# Patient Record
Sex: FEMALE | Race: WHITE | HISPANIC OR LATINO | Employment: OTHER | URBAN - METROPOLITAN AREA
[De-identification: names, ages, dates, MRNs, and addresses within clinical notes are randomized per-mention and may not be internally consistent; named-entity substitution may affect disease eponyms.]

---

## 2019-07-29 ENCOUNTER — HOSPITAL ENCOUNTER (EMERGENCY)
Age: 59
Discharge: HOME | End: 2019-07-29

## 2019-07-29 VITALS
RESPIRATION RATE: 18 BRPM | HEIGHT: 65 IN | DIASTOLIC BLOOD PRESSURE: 76 MMHG | SYSTOLIC BLOOD PRESSURE: 120 MMHG | WEIGHT: 117.2 LBS | OXYGEN SATURATION: 97 % | HEART RATE: 54 BPM | TEMPERATURE: 98.2 F | BODY MASS INDEX: 19.53 KG/M2

## 2019-07-29 DIAGNOSIS — H10.33 ACUTE CONJUNCTIVITIS OF BOTH EYES, UNSPECIFIED ACUTE CONJUNCTIVITIS TYPE: Primary | ICD-10-CM

## 2019-07-29 PROCEDURE — 99282 EMERGENCY DEPT VISIT SF MDM: CPT

## 2019-07-29 RX ORDER — PROPARACAINE HYDROCHLORIDE 5 MG/ML
2 SOLUTION/ DROPS OPHTHALMIC ONCE
Status: DISCONTINUED | OUTPATIENT
Start: 2019-07-29 | End: 2019-07-29

## 2019-07-29 RX ORDER — ERYTHROMYCIN 5 MG/G
0.5 OINTMENT OPHTHALMIC 3 TIMES DAILY
Qty: 3.5 G | Refills: 0 | Status: SHIPPED | OUTPATIENT
Start: 2019-07-29 | End: 2019-08-05

## 2019-07-29 ASSESSMENT — ENCOUNTER SYMPTOMS
FEVER: 0
WEAKNESS: 0
ACTIVITY CHANGE: 0
EYE DISCHARGE: 0
ADENOPATHY: 0
EYE REDNESS: 1
COUGH: 0
HEADACHES: 0
NAUSEA: 0
VOMITING: 0
WHEEZING: 0
SHORTNESS OF BREATH: 0
EYE ITCHING: 1
CHILLS: 0
BACK PAIN: 0
PHOTOPHOBIA: 0
FATIGUE: 0
BRUISES/BLEEDS EASILY: 0
CONSTIPATION: 0
NERVOUS/ANXIOUS: 0
ABDOMINAL PAIN: 0
EYE PAIN: 0
DIARRHEA: 0

## 2019-07-29 ASSESSMENT — PAIN SCALES - GENERAL: PAINLEVEL_OUTOF10: 0

## 2023-07-21 ENCOUNTER — TELEPHONE (OUTPATIENT)
Dept: PRIMARY CARE | Facility: CLINIC | Age: 63
End: 2023-07-21
Payer: COMMERCIAL

## 2023-07-21 DIAGNOSIS — I10 ESSENTIAL HYPERTENSION: ICD-10-CM

## 2023-07-21 RX ORDER — LOSARTAN POTASSIUM 50 MG/1
1 TABLET ORAL DAILY
COMMUNITY
Start: 2021-01-11 | End: 2023-07-24 | Stop reason: SDUPTHER

## 2023-07-21 NOTE — TELEPHONE ENCOUNTER
Rx Refill Request Telephone Encounter    Name:  Jessie Hogan  :  [id number]  Medication Name:  Losartan   Dose : 50 mg  Route : oral  Frequency : daily  Quantity : 90    Specific Pharmacy location:  [facility]  Date of last appointment:  Oct  2021  Date of next appointment:    Best number to reach patient:  [phone]

## 2023-07-24 PROBLEM — I10 ESSENTIAL HYPERTENSION: Status: ACTIVE | Noted: 2023-07-24

## 2023-07-24 RX ORDER — LOSARTAN POTASSIUM 50 MG/1
50 TABLET ORAL DAILY
Qty: 90 TABLET | Refills: 0 | Status: SHIPPED | OUTPATIENT
Start: 2023-07-24 | End: 2023-12-05 | Stop reason: SDUPTHER

## 2023-08-17 ENCOUNTER — TELEPHONE (OUTPATIENT)
Dept: PRIMARY CARE | Facility: CLINIC | Age: 63
End: 2023-08-17
Payer: COMMERCIAL

## 2023-08-17 DIAGNOSIS — E78.2 MIXED HYPERLIPIDEMIA: ICD-10-CM

## 2023-08-17 DIAGNOSIS — Z13.29 THYROID DISORDER SCREEN: ICD-10-CM

## 2023-08-17 DIAGNOSIS — I10 ESSENTIAL HYPERTENSION: ICD-10-CM

## 2023-08-17 DIAGNOSIS — E55.9 VITAMIN D DEFICIENCY: Primary | ICD-10-CM

## 2023-08-17 DIAGNOSIS — R73.01 IMPAIRED FASTING GLUCOSE: ICD-10-CM

## 2023-08-17 PROBLEM — E78.5 HYPERLIPIDEMIA: Status: ACTIVE | Noted: 2023-08-17

## 2023-08-17 NOTE — TELEPHONE ENCOUNTER
Patient is requesting labs to be ordered so she can discuss the results with you at her appt on Monday.

## 2023-08-18 ENCOUNTER — LAB (OUTPATIENT)
Dept: LAB | Facility: LAB | Age: 63
End: 2023-08-18
Payer: COMMERCIAL

## 2023-08-18 DIAGNOSIS — I10 ESSENTIAL HYPERTENSION: ICD-10-CM

## 2023-08-18 DIAGNOSIS — E78.2 MIXED HYPERLIPIDEMIA: ICD-10-CM

## 2023-08-18 DIAGNOSIS — Z13.29 THYROID DISORDER SCREEN: ICD-10-CM

## 2023-08-18 DIAGNOSIS — E55.9 VITAMIN D DEFICIENCY: ICD-10-CM

## 2023-08-18 DIAGNOSIS — R73.01 IMPAIRED FASTING GLUCOSE: ICD-10-CM

## 2023-08-18 LAB
ALANINE AMINOTRANSFERASE (SGPT) (U/L) IN SER/PLAS: 25 U/L (ref 7–45)
ALBUMIN (G/DL) IN SER/PLAS: 4.5 G/DL (ref 3.4–5)
ALKALINE PHOSPHATASE (U/L) IN SER/PLAS: 68 U/L (ref 33–136)
ANION GAP IN SER/PLAS: 12 MMOL/L (ref 10–20)
ASPARTATE AMINOTRANSFERASE (SGOT) (U/L) IN SER/PLAS: 18 U/L (ref 9–39)
BILIRUBIN TOTAL (MG/DL) IN SER/PLAS: 0.5 MG/DL (ref 0–1.2)
CALCIDIOL (25 OH VITAMIN D3) (NG/ML) IN SER/PLAS: 42 NG/ML
CALCIUM (MG/DL) IN SER/PLAS: 9.7 MG/DL (ref 8.6–10.3)
CARBON DIOXIDE, TOTAL (MMOL/L) IN SER/PLAS: 28 MMOL/L (ref 21–32)
CHLORIDE (MMOL/L) IN SER/PLAS: 105 MMOL/L (ref 98–107)
CHOLESTEROL (MG/DL) IN SER/PLAS: 221 MG/DL (ref 0–199)
CHOLESTEROL IN HDL (MG/DL) IN SER/PLAS: 49.3 MG/DL
CHOLESTEROL/HDL RATIO: 4.5
CREATININE (MG/DL) IN SER/PLAS: 0.59 MG/DL (ref 0.5–1.05)
ERYTHROCYTE DISTRIBUTION WIDTH (RATIO) BY AUTOMATED COUNT: 12.2 % (ref 11.5–14.5)
ERYTHROCYTE MEAN CORPUSCULAR HEMOGLOBIN CONCENTRATION (G/DL) BY AUTOMATED: 32.7 G/DL (ref 32–36)
ERYTHROCYTE MEAN CORPUSCULAR VOLUME (FL) BY AUTOMATED COUNT: 89 FL (ref 80–100)
ERYTHROCYTES (10*6/UL) IN BLOOD BY AUTOMATED COUNT: 4.79 X10E12/L (ref 4–5.2)
ESTIMATED AVERAGE GLUCOSE FOR HBA1C: 160 MG/DL
GFR FEMALE: >90 ML/MIN/1.73M2
GLUCOSE (MG/DL) IN SER/PLAS: 131 MG/DL (ref 74–99)
HEMATOCRIT (%) IN BLOOD BY AUTOMATED COUNT: 42.5 % (ref 36–46)
HEMOGLOBIN (G/DL) IN BLOOD: 13.9 G/DL (ref 12–16)
HEMOGLOBIN A1C/HEMOGLOBIN TOTAL IN BLOOD: 7.2 %
LDL: 136 MG/DL (ref 0–99)
LEUKOCYTES (10*3/UL) IN BLOOD BY AUTOMATED COUNT: 6.6 X10E9/L (ref 4.4–11.3)
PLATELETS (10*3/UL) IN BLOOD AUTOMATED COUNT: 227 X10E9/L (ref 150–450)
POTASSIUM (MMOL/L) IN SER/PLAS: 4.7 MMOL/L (ref 3.5–5.3)
PROTEIN TOTAL: 7.1 G/DL (ref 6.4–8.2)
SODIUM (MMOL/L) IN SER/PLAS: 140 MMOL/L (ref 136–145)
THYROTROPIN (MIU/L) IN SER/PLAS BY DETECTION LIMIT <= 0.05 MIU/L: 1.22 MIU/L (ref 0.44–3.98)
TRIGLYCERIDE (MG/DL) IN SER/PLAS: 180 MG/DL (ref 0–149)
UREA NITROGEN (MG/DL) IN SER/PLAS: 15 MG/DL (ref 6–23)
VLDL: 36 MG/DL (ref 0–40)

## 2023-08-18 PROCEDURE — 83036 HEMOGLOBIN GLYCOSYLATED A1C: CPT

## 2023-08-18 PROCEDURE — 85027 COMPLETE CBC AUTOMATED: CPT

## 2023-08-18 PROCEDURE — 82306 VITAMIN D 25 HYDROXY: CPT

## 2023-08-18 PROCEDURE — 80053 COMPREHEN METABOLIC PANEL: CPT

## 2023-08-18 PROCEDURE — 84443 ASSAY THYROID STIM HORMONE: CPT

## 2023-08-18 PROCEDURE — 80061 LIPID PANEL: CPT

## 2023-08-18 PROCEDURE — 36415 COLL VENOUS BLD VENIPUNCTURE: CPT

## 2023-08-21 ENCOUNTER — OFFICE VISIT (OUTPATIENT)
Dept: PRIMARY CARE | Facility: CLINIC | Age: 63
End: 2023-08-21
Payer: COMMERCIAL

## 2023-08-21 VITALS
SYSTOLIC BLOOD PRESSURE: 139 MMHG | HEIGHT: 65 IN | OXYGEN SATURATION: 93 % | WEIGHT: 209 LBS | RESPIRATION RATE: 14 BRPM | HEART RATE: 92 BPM | DIASTOLIC BLOOD PRESSURE: 86 MMHG | BODY MASS INDEX: 34.82 KG/M2 | TEMPERATURE: 97.7 F

## 2023-08-21 DIAGNOSIS — E55.9 VITAMIN D DEFICIENCY: ICD-10-CM

## 2023-08-21 DIAGNOSIS — E78.2 MIXED HYPERLIPIDEMIA: ICD-10-CM

## 2023-08-21 DIAGNOSIS — Z23 ENCOUNTER FOR IMMUNIZATION: ICD-10-CM

## 2023-08-21 DIAGNOSIS — E11.9 DIET-CONTROLLED TYPE 2 DIABETES MELLITUS (MULTI): Primary | ICD-10-CM

## 2023-08-21 DIAGNOSIS — Z12.31 OTHER SCREENING MAMMOGRAM: ICD-10-CM

## 2023-08-21 DIAGNOSIS — I10 ESSENTIAL HYPERTENSION: ICD-10-CM

## 2023-08-21 PROBLEM — R73.01 IMPAIRED FASTING GLUCOSE: Status: RESOLVED | Noted: 2023-08-17 | Resolved: 2023-08-21

## 2023-08-21 PROCEDURE — 3075F SYST BP GE 130 - 139MM HG: CPT | Performed by: FAMILY MEDICINE

## 2023-08-21 PROCEDURE — 90471 IMMUNIZATION ADMIN: CPT | Performed by: FAMILY MEDICINE

## 2023-08-21 PROCEDURE — 4010F ACE/ARB THERAPY RXD/TAKEN: CPT | Performed by: FAMILY MEDICINE

## 2023-08-21 PROCEDURE — 3051F HG A1C>EQUAL 7.0%<8.0%: CPT | Performed by: FAMILY MEDICINE

## 2023-08-21 PROCEDURE — 99214 OFFICE O/P EST MOD 30 MIN: CPT | Performed by: FAMILY MEDICINE

## 2023-08-21 PROCEDURE — 1036F TOBACCO NON-USER: CPT | Performed by: FAMILY MEDICINE

## 2023-08-21 PROCEDURE — 3079F DIAST BP 80-89 MM HG: CPT | Performed by: FAMILY MEDICINE

## 2023-08-21 PROCEDURE — 90750 HZV VACC RECOMBINANT IM: CPT | Performed by: FAMILY MEDICINE

## 2023-08-21 RX ORDER — TRIAMCINOLONE ACETONIDE 1 MG/G
OINTMENT TOPICAL
COMMUNITY
Start: 2023-06-15

## 2023-08-21 RX ORDER — CHOLECALCIFEROL (VITAMIN D3) 125 MCG
1 CAPSULE ORAL DAILY
COMMUNITY
Start: 2020-11-19

## 2023-08-21 ASSESSMENT — ENCOUNTER SYMPTOMS
ARTHRALGIAS: 0
CHILLS: 0
FEVER: 0
CHEST TIGHTNESS: 0
CONFUSION: 0
PALPITATIONS: 0
ABDOMINAL PAIN: 0
SHORTNESS OF BREATH: 0

## 2023-08-21 ASSESSMENT — PATIENT HEALTH QUESTIONNAIRE - PHQ9
SUM OF ALL RESPONSES TO PHQ9 QUESTIONS 1 AND 2: 0
1. LITTLE INTEREST OR PLEASURE IN DOING THINGS: NOT AT ALL
2. FEELING DOWN, DEPRESSED OR HOPELESS: NOT AT ALL

## 2023-08-21 NOTE — PROGRESS NOTES
"Subjective   Patient ID: Jessie Hogan is a 63 y.o. female who presents for Annual Exam.    HPI patient today for follow-up of ongoing healthcare issues and for annual checkup she has not been in since the fall of last year.  She admits she has not been following a proper diet with regards to diabetes and hyperlipidemia.    Review of Systems   Constitutional:  Negative for chills and fever.   HENT:  Negative for congestion and ear pain.    Eyes:  Negative for visual disturbance.   Respiratory:  Negative for chest tightness and shortness of breath.    Cardiovascular:  Negative for chest pain and palpitations.   Gastrointestinal:  Negative for abdominal pain.   Musculoskeletal:  Negative for arthralgias.   Skin:  Negative for pallor.   Psychiatric/Behavioral:  Negative for confusion.        Objective   /86   Pulse 92   Temp 36.5 °C (97.7 °F)   Resp 14   Ht 1.651 m (5' 5\")   Wt 94.8 kg (209 lb)   SpO2 93%   BMI 34.78 kg/m²     Physical Exam  Vitals and nursing note reviewed.   Constitutional:       General: She is not in acute distress.     Appearance: Normal appearance. She is not ill-appearing.   HENT:      Head: Normocephalic and atraumatic.      Right Ear: Tympanic membrane, ear canal and external ear normal.      Left Ear: Tympanic membrane, ear canal and external ear normal.      Mouth/Throat:      Pharynx: Oropharynx is clear.   Eyes:      Extraocular Movements: Extraocular movements intact.   Cardiovascular:      Rate and Rhythm: Normal rate and regular rhythm.      Pulses: Normal pulses.      Heart sounds: Normal heart sounds.   Pulmonary:      Effort: Pulmonary effort is normal.      Breath sounds: Normal breath sounds.   Abdominal:      General: Abdomen is flat. Bowel sounds are normal.      Palpations: Abdomen is soft.      Tenderness: There is no abdominal tenderness.   Musculoskeletal:         General: Normal range of motion.      Cervical back: Neck supple.   Skin:     General: Skin is warm. "   Neurological:      Mental Status: She is alert and oriented to person, place, and time. Mental status is at baseline.   Psychiatric:         Mood and Affect: Mood normal.       Recent Results (from the past 1008 hour(s))   CBC    Collection Time: 08/18/23 11:19 AM   Result Value Ref Range    WBC 6.6 4.4 - 11.3 x10E9/L    RBC 4.79 4.00 - 5.20 x10E12/L    Hemoglobin 13.9 12.0 - 16.0 g/dL    Hematocrit 42.5 36.0 - 46.0 %    MCV 89 80 - 100 fL    MCHC 32.7 32.0 - 36.0 g/dL    Platelets 227 150 - 450 x10E9/L    RDW 12.2 11.5 - 14.5 %   Comprehensive Metabolic Panel    Collection Time: 08/18/23 11:19 AM   Result Value Ref Range    Glucose 131 (H) 74 - 99 mg/dL    Sodium 140 136 - 145 mmol/L    Potassium 4.7 3.5 - 5.3 mmol/L    Chloride 105 98 - 107 mmol/L    Bicarbonate 28 21 - 32 mmol/L    Anion Gap 12 10 - 20 mmol/L    Urea Nitrogen 15 6 - 23 mg/dL    Creatinine 0.59 0.50 - 1.05 mg/dL    GFR Female >90 >90 mL/min/1.73m2    Calcium 9.7 8.6 - 10.3 mg/dL    Albumin 4.5 3.4 - 5.0 g/dL    Alkaline Phosphatase 68 33 - 136 U/L    Total Protein 7.1 6.4 - 8.2 g/dL    AST 18 9 - 39 U/L    Total Bilirubin 0.5 0.0 - 1.2 mg/dL    ALT (SGPT) 25 7 - 45 U/L   Hemoglobin A1C    Collection Time: 08/18/23 11:19 AM   Result Value Ref Range    Hemoglobin A1C 7.2 (A) %    Estimated Average Glucose 160 MG/DL   Lipid Panel    Collection Time: 08/18/23 11:19 AM   Result Value Ref Range    Cholesterol 221 (H) 0 - 199 mg/dL    HDL 49.3 mg/dL    Cholesterol/HDL Ratio 4.5      (H) 0 - 99 mg/dL    VLDL 36 0 - 40 mg/dL    Triglycerides 180 (H) 0 - 149 mg/dL   Vitamin D, Total    Collection Time: 08/18/23 11:19 AM   Result Value Ref Range    Vitamin D, 25-Hydroxy 42 ng/mL   TSH with reflex to Free T4 if abnormal    Collection Time: 08/18/23 11:19 AM   Result Value Ref Range    TSH 1.22 0.44 - 3.98 mIU/L     Recent labs reviewed with patient    Shingrix No. 1 today plan on receiving second Shingrix at next office visit beginning of  December    Return in approximately 3 and half months with repeat fasting labs    Bilateral screening mammogram ordered.      Assessment/Plan   Problem List Items Addressed This Visit       Essential hypertension     Stable continue current medication         Relevant Orders    Follow Up In Primary Care - Established    Comprehensive Metabolic Panel    Hyperlipidemia     LDLs not to goal triglycerides are also slightly elevated.  Given these results in conjunction with her new type II diabetic diagnosis we discussed starting medication such as a statin in addition to diet and exercise at this point she is refusing she wants an opportunity to try lifestyle modifications first to see if she can get the numbers to goal.         Relevant Orders    Follow Up In Primary Care - Established    Comprehensive Metabolic Panel    Lipid Panel    Vitamin D deficiency     Continue to monitor and supplement         Diet-controlled type 2 diabetes mellitus (CMS/[facility]) - Primary     A1c at 7.2%.  We discussed type 2 diabetes.  We discussed treatment plans including starting medication and the various options and additional lifestyle modifications.  At this point she wants an opportunity to try to manage and improve her A1c without medicine and implement stricter lifestyle modifications with regards to diet and exercise.  Long discussion regarding lifestyle changes occluding diet and exercise.  We will lower this opportunity and recheck in approximately 3 months.         Relevant Orders    Follow Up In Primary Care - Established    Comprehensive Metabolic Panel    Hemoglobin A1C    Encounter for immunization     Shingrix No. 1 today return within 2 to 6 months for Shingrix No. 2 risk-benefit side effects reviewed she verbalizes understanding         Relevant Orders    Zoster vaccine, recombinant, adult (SHINGRIX) (Completed)    Other screening mammogram     Bilateral screening mammogram         Relevant Orders    BI mammo bilateral  screening tomosynthesis

## 2023-08-21 NOTE — ASSESSMENT & PLAN NOTE
LDLs not to goal triglycerides are also slightly elevated.  Given these results in conjunction with her new type II diabetic diagnosis we discussed starting medication such as a statin in addition to diet and exercise at this point she is refusing she wants an opportunity to try lifestyle modifications first to see if she can get the numbers to goal.

## 2023-08-21 NOTE — ASSESSMENT & PLAN NOTE
Shingrix No. 1 today return within 2 to 6 months for Shingrix No. 2 risk-benefit side effects reviewed she verbalizes understanding

## 2023-08-21 NOTE — ASSESSMENT & PLAN NOTE
A1c at 7.2%.  We discussed type 2 diabetes.  We discussed treatment plans including starting medication and the various options and additional lifestyle modifications.  At this point she wants an opportunity to try to manage and improve her A1c without medicine and implement stricter lifestyle modifications with regards to diet and exercise.  Long discussion regarding lifestyle changes occluding diet and exercise.  We will lower this opportunity and recheck in approximately 3 months.

## 2023-12-04 ENCOUNTER — LAB (OUTPATIENT)
Dept: LAB | Facility: LAB | Age: 63
End: 2023-12-04
Payer: COMMERCIAL

## 2023-12-04 DIAGNOSIS — E11.9 DIET-CONTROLLED TYPE 2 DIABETES MELLITUS (MULTI): ICD-10-CM

## 2023-12-04 DIAGNOSIS — E78.2 MIXED HYPERLIPIDEMIA: ICD-10-CM

## 2023-12-04 DIAGNOSIS — I10 ESSENTIAL HYPERTENSION: ICD-10-CM

## 2023-12-04 LAB
ALBUMIN SERPL BCP-MCNC: 4.4 G/DL (ref 3.4–5)
ALP SERPL-CCNC: 68 U/L (ref 33–136)
ALT SERPL W P-5'-P-CCNC: 23 U/L (ref 7–45)
ANION GAP SERPL CALC-SCNC: 9 MMOL/L (ref 10–20)
AST SERPL W P-5'-P-CCNC: 22 U/L (ref 9–39)
BILIRUB SERPL-MCNC: 0.6 MG/DL (ref 0–1.2)
BUN SERPL-MCNC: 9 MG/DL (ref 6–23)
CALCIUM SERPL-MCNC: 9.6 MG/DL (ref 8.6–10.3)
CHLORIDE SERPL-SCNC: 106 MMOL/L (ref 98–107)
CHOLEST SERPL-MCNC: 220 MG/DL (ref 0–199)
CHOLESTEROL/HDL RATIO: 4.5
CO2 SERPL-SCNC: 27 MMOL/L (ref 21–32)
CREAT SERPL-MCNC: 0.71 MG/DL (ref 0.5–1.05)
GFR SERPL CREATININE-BSD FRML MDRD: >90 ML/MIN/1.73M*2
GLUCOSE SERPL-MCNC: 123 MG/DL (ref 74–99)
HDLC SERPL-MCNC: 49 MG/DL
LDLC SERPL CALC-MCNC: 144 MG/DL
NON HDL CHOLESTEROL: 171 MG/DL (ref 0–149)
POTASSIUM SERPL-SCNC: 4.3 MMOL/L (ref 3.5–5.3)
PROT SERPL-MCNC: 7.2 G/DL (ref 6.4–8.2)
SODIUM SERPL-SCNC: 138 MMOL/L (ref 136–145)
TRIGL SERPL-MCNC: 136 MG/DL (ref 0–149)
VLDL: 27 MG/DL (ref 0–40)

## 2023-12-04 PROCEDURE — 80061 LIPID PANEL: CPT

## 2023-12-04 PROCEDURE — 36415 COLL VENOUS BLD VENIPUNCTURE: CPT

## 2023-12-04 PROCEDURE — 80053 COMPREHEN METABOLIC PANEL: CPT

## 2023-12-04 PROCEDURE — 83036 HEMOGLOBIN GLYCOSYLATED A1C: CPT

## 2023-12-05 ENCOUNTER — OFFICE VISIT (OUTPATIENT)
Dept: PRIMARY CARE | Facility: CLINIC | Age: 63
End: 2023-12-05
Payer: COMMERCIAL

## 2023-12-05 VITALS
OXYGEN SATURATION: 96 % | DIASTOLIC BLOOD PRESSURE: 88 MMHG | TEMPERATURE: 96.8 F | BODY MASS INDEX: 33.12 KG/M2 | HEART RATE: 80 BPM | WEIGHT: 199 LBS | SYSTOLIC BLOOD PRESSURE: 148 MMHG | RESPIRATION RATE: 14 BRPM

## 2023-12-05 DIAGNOSIS — Z23 ENCOUNTER FOR IMMUNIZATION: ICD-10-CM

## 2023-12-05 DIAGNOSIS — E78.2 MIXED HYPERLIPIDEMIA: ICD-10-CM

## 2023-12-05 DIAGNOSIS — I10 ESSENTIAL HYPERTENSION: ICD-10-CM

## 2023-12-05 DIAGNOSIS — E11.9 DIET-CONTROLLED TYPE 2 DIABETES MELLITUS (MULTI): Primary | ICD-10-CM

## 2023-12-05 LAB
EST. AVERAGE GLUCOSE BLD GHB EST-MCNC: 140 MG/DL
HBA1C MFR BLD: 6.5 %

## 2023-12-05 PROCEDURE — 3044F HG A1C LEVEL LT 7.0%: CPT | Performed by: FAMILY MEDICINE

## 2023-12-05 PROCEDURE — 1036F TOBACCO NON-USER: CPT | Performed by: FAMILY MEDICINE

## 2023-12-05 PROCEDURE — 3077F SYST BP >= 140 MM HG: CPT | Performed by: FAMILY MEDICINE

## 2023-12-05 PROCEDURE — 90471 IMMUNIZATION ADMIN: CPT | Performed by: FAMILY MEDICINE

## 2023-12-05 PROCEDURE — 99214 OFFICE O/P EST MOD 30 MIN: CPT | Performed by: FAMILY MEDICINE

## 2023-12-05 PROCEDURE — 3079F DIAST BP 80-89 MM HG: CPT | Performed by: FAMILY MEDICINE

## 2023-12-05 PROCEDURE — 4010F ACE/ARB THERAPY RXD/TAKEN: CPT | Performed by: FAMILY MEDICINE

## 2023-12-05 PROCEDURE — 3050F LDL-C >= 130 MG/DL: CPT | Performed by: FAMILY MEDICINE

## 2023-12-05 PROCEDURE — 90750 HZV VACC RECOMBINANT IM: CPT | Performed by: FAMILY MEDICINE

## 2023-12-05 RX ORDER — LOSARTAN POTASSIUM 50 MG/1
50 TABLET ORAL DAILY
Qty: 90 TABLET | Refills: 3 | Status: SHIPPED | OUTPATIENT
Start: 2023-12-05 | End: 2024-04-29 | Stop reason: SDUPTHER

## 2023-12-05 ASSESSMENT — ENCOUNTER SYMPTOMS
FEVER: 0
ARTHRALGIAS: 0
CHEST TIGHTNESS: 0
PALPITATIONS: 0
CONFUSION: 0
CHILLS: 0
SHORTNESS OF BREATH: 0
ABDOMINAL PAIN: 0

## 2023-12-05 NOTE — ASSESSMENT & PLAN NOTE
Cholesterol/LDL is trending upward we discussed try to get LDL below 100 given her other medical issues we discussed starting cholesterol-lowering medicine at this point she is declined she wants to try lifestyle modifications first.

## 2023-12-05 NOTE — ASSESSMENT & PLAN NOTE
Resume Cozaar 50 mg daily refill sent to pharmacy.  Patient advised moving forward not to go without our hypertensive medicine for weeks at a time.

## 2023-12-05 NOTE — PROGRESS NOTES
Subjective   Patient ID: Jessie Hogan is a 63 y.o. female who presents for Follow-up (3 month).    HPI   Patient today for follow-up of ongoing healthcare issues and review blood work.  She states she has been out of her blood pressure medicine for a couple of weeks stating she knew she had an upcoming appointment.  Review of Systems   Constitutional:  Negative for chills and fever.   HENT:  Negative for congestion and ear pain.    Eyes:  Negative for visual disturbance.   Respiratory:  Negative for chest tightness and shortness of breath.    Cardiovascular:  Negative for chest pain and palpitations.   Gastrointestinal:  Negative for abdominal pain.   Musculoskeletal:  Negative for arthralgias.   Skin:  Negative for pallor.   Psychiatric/Behavioral:  Negative for confusion.        Objective   /88   Pulse 80   Temp 36 °C (96.8 °F)   Resp 14   Wt 90.3 kg (199 lb)   SpO2 96%   BMI 33.12 kg/m²     Physical Exam  Vitals and nursing note reviewed.   Constitutional:       General: She is not in acute distress.     Appearance: Normal appearance. She is not ill-appearing.   HENT:      Head: Normocephalic and atraumatic.      Right Ear: Tympanic membrane, ear canal and external ear normal.      Left Ear: Tympanic membrane, ear canal and external ear normal.      Mouth/Throat:      Pharynx: Oropharynx is clear.   Eyes:      Extraocular Movements: Extraocular movements intact.   Cardiovascular:      Rate and Rhythm: Normal rate and regular rhythm.      Pulses: Normal pulses.      Heart sounds: Normal heart sounds.   Pulmonary:      Effort: Pulmonary effort is normal.      Breath sounds: Normal breath sounds.   Abdominal:      General: Abdomen is flat. Bowel sounds are normal.      Palpations: Abdomen is soft.      Tenderness: There is no abdominal tenderness.   Musculoskeletal:         General: Normal range of motion.      Cervical back: Neck supple.   Skin:     General: Skin is warm.   Neurological:      Mental  Status: She is alert and oriented to person, place, and time. Mental status is at baseline.   Psychiatric:         Mood and Affect: Mood normal.       Recent Results (from the past 1008 hour(s))   Comprehensive Metabolic Panel    Collection Time: 12/04/23  9:09 AM   Result Value Ref Range    Glucose 123 (H) 74 - 99 mg/dL    Sodium 138 136 - 145 mmol/L    Potassium 4.3 3.5 - 5.3 mmol/L    Chloride 106 98 - 107 mmol/L    Bicarbonate 27 21 - 32 mmol/L    Anion Gap 9 (L) 10 - 20 mmol/L    Urea Nitrogen 9 6 - 23 mg/dL    Creatinine 0.71 0.50 - 1.05 mg/dL    eGFR >90 >60 mL/min/1.73m*2    Calcium 9.6 8.6 - 10.3 mg/dL    Albumin 4.4 3.4 - 5.0 g/dL    Alkaline Phosphatase 68 33 - 136 U/L    Total Protein 7.2 6.4 - 8.2 g/dL    AST 22 9 - 39 U/L    Bilirubin, Total 0.6 0.0 - 1.2 mg/dL    ALT 23 7 - 45 U/L   Hemoglobin A1C    Collection Time: 12/04/23  9:09 AM   Result Value Ref Range    Hemoglobin A1C 6.5 (H) see below %    Estimated Average Glucose 140 Not Established mg/dL   Lipid Panel    Collection Time: 12/04/23  9:09 AM   Result Value Ref Range    Cholesterol 220 (H) 0 - 199 mg/dL    HDL-Cholesterol 49.0 mg/dL    Cholesterol/HDL Ratio 4.5     LDL Calculated 144 (H) <=99 mg/dL    VLDL 27 0 - 40 mg/dL    Triglycerides 136 0 - 149 mg/dL    Non HDL Cholesterol 171 (H) 0 - 149 mg/dL     Recent labs reviewed with patient she is to follow a low-fat low-cholesterol diabetic diet    Shingrix No. 2 today    She also plans on getting a COVID booster in a couple weeks she states she is already had the RSV vaccine and flu vaccine for this year.    Return to our office in 3 and half months with repeat fasting lab    Refill on Cozaar provided    Assessment/Plan   Problem List Items Addressed This Visit             ICD-10-CM    Essential hypertension I10     Resume Cozaar 50 mg daily refill sent to pharmacy.  Patient advised moving forward not to go without our hypertensive medicine for weeks at a time.         Relevant Medications     losartan (Cozaar) 50 mg tablet    Other Relevant Orders    Follow Up In Primary Care - Established    Hyperlipidemia E78.5     Cholesterol/LDL is trending upward we discussed try to get LDL below 100 given her other medical issues we discussed starting cholesterol-lowering medicine at this point she is declined she wants to try lifestyle modifications first.         Relevant Orders    Lipid Panel    Cholesterol, LDL Direct    Follow Up In Primary Care - Established    Diet-controlled type 2 diabetes mellitus (CMS/[facility]) - Primary E11.9     A1c is improved down to 6.5% continue dietary modifications         Relevant Orders    Hemoglobin A1C    Comprehensive Metabolic Panel    Follow Up In Primary Care - Established    Encounter for immunization Z23     Shingrix No. 2 today         Relevant Orders    Zoster vaccine, recombinant, adult (SHINGRIX)

## 2024-04-02 ENCOUNTER — APPOINTMENT (OUTPATIENT)
Dept: PRIMARY CARE | Facility: CLINIC | Age: 64
End: 2024-04-02
Payer: COMMERCIAL

## 2024-04-26 ENCOUNTER — LAB (OUTPATIENT)
Dept: LAB | Facility: LAB | Age: 64
End: 2024-04-26
Payer: COMMERCIAL

## 2024-04-26 DIAGNOSIS — E78.2 MIXED HYPERLIPIDEMIA: ICD-10-CM

## 2024-04-26 DIAGNOSIS — E11.9 DIET-CONTROLLED TYPE 2 DIABETES MELLITUS (MULTI): ICD-10-CM

## 2024-04-26 LAB
ALBUMIN SERPL BCP-MCNC: 4.2 G/DL (ref 3.4–5)
ALP SERPL-CCNC: 70 U/L (ref 33–136)
ALT SERPL W P-5'-P-CCNC: 16 U/L (ref 7–45)
ANION GAP SERPL CALC-SCNC: 12 MMOL/L (ref 10–20)
AST SERPL W P-5'-P-CCNC: 16 U/L (ref 9–39)
BILIRUB SERPL-MCNC: 0.6 MG/DL (ref 0–1.2)
BUN SERPL-MCNC: 13 MG/DL (ref 6–23)
CALCIUM SERPL-MCNC: 9.7 MG/DL (ref 8.6–10.3)
CHLORIDE SERPL-SCNC: 106 MMOL/L (ref 98–107)
CHOLEST SERPL-MCNC: 213 MG/DL (ref 0–199)
CHOLESTEROL/HDL RATIO: 4.5
CO2 SERPL-SCNC: 29 MMOL/L (ref 21–32)
CREAT SERPL-MCNC: 0.79 MG/DL (ref 0.5–1.05)
EGFRCR SERPLBLD CKD-EPI 2021: 85 ML/MIN/1.73M*2
EST. AVERAGE GLUCOSE BLD GHB EST-MCNC: 137 MG/DL
GLUCOSE SERPL-MCNC: 135 MG/DL (ref 74–99)
HBA1C MFR BLD: 6.4 %
HDLC SERPL-MCNC: 47.6 MG/DL
LDLC SERPL CALC-MCNC: 124 MG/DL
LDLC SERPL DIRECT ASSAY-MCNC: 143 MG/DL (ref 0–129)
NON HDL CHOLESTEROL: 165 MG/DL (ref 0–149)
POTASSIUM SERPL-SCNC: 4.7 MMOL/L (ref 3.5–5.3)
PROT SERPL-MCNC: 6.8 G/DL (ref 6.4–8.2)
SODIUM SERPL-SCNC: 142 MMOL/L (ref 136–145)
TRIGL SERPL-MCNC: 206 MG/DL (ref 0–149)
VLDL: 41 MG/DL (ref 0–40)

## 2024-04-26 PROCEDURE — 36415 COLL VENOUS BLD VENIPUNCTURE: CPT

## 2024-04-26 PROCEDURE — 80053 COMPREHEN METABOLIC PANEL: CPT

## 2024-04-26 PROCEDURE — 80061 LIPID PANEL: CPT

## 2024-04-26 PROCEDURE — 83721 ASSAY OF BLOOD LIPOPROTEIN: CPT

## 2024-04-26 PROCEDURE — 83036 HEMOGLOBIN GLYCOSYLATED A1C: CPT

## 2024-04-29 ENCOUNTER — OFFICE VISIT (OUTPATIENT)
Dept: PRIMARY CARE | Facility: CLINIC | Age: 64
End: 2024-04-29
Payer: COMMERCIAL

## 2024-04-29 VITALS
OXYGEN SATURATION: 94 % | TEMPERATURE: 97.4 F | SYSTOLIC BLOOD PRESSURE: 133 MMHG | RESPIRATION RATE: 14 BRPM | BODY MASS INDEX: 32.12 KG/M2 | WEIGHT: 193 LBS | HEART RATE: 80 BPM | DIASTOLIC BLOOD PRESSURE: 84 MMHG

## 2024-04-29 DIAGNOSIS — E55.9 VITAMIN D DEFICIENCY: ICD-10-CM

## 2024-04-29 DIAGNOSIS — Z13.29 THYROID DISORDER SCREEN: ICD-10-CM

## 2024-04-29 DIAGNOSIS — I10 ESSENTIAL HYPERTENSION: ICD-10-CM

## 2024-04-29 DIAGNOSIS — E11.9 DIET-CONTROLLED TYPE 2 DIABETES MELLITUS (MULTI): Primary | ICD-10-CM

## 2024-04-29 DIAGNOSIS — Z12.31 OTHER SCREENING MAMMOGRAM: ICD-10-CM

## 2024-04-29 DIAGNOSIS — E78.2 MIXED HYPERLIPIDEMIA: ICD-10-CM

## 2024-04-29 PROCEDURE — 4010F ACE/ARB THERAPY RXD/TAKEN: CPT | Performed by: FAMILY MEDICINE

## 2024-04-29 PROCEDURE — 3075F SYST BP GE 130 - 139MM HG: CPT | Performed by: FAMILY MEDICINE

## 2024-04-29 PROCEDURE — 3050F LDL-C >= 130 MG/DL: CPT | Performed by: FAMILY MEDICINE

## 2024-04-29 PROCEDURE — 3079F DIAST BP 80-89 MM HG: CPT | Performed by: FAMILY MEDICINE

## 2024-04-29 PROCEDURE — 99214 OFFICE O/P EST MOD 30 MIN: CPT | Performed by: FAMILY MEDICINE

## 2024-04-29 PROCEDURE — 3044F HG A1C LEVEL LT 7.0%: CPT | Performed by: FAMILY MEDICINE

## 2024-04-29 PROCEDURE — 1036F TOBACCO NON-USER: CPT | Performed by: FAMILY MEDICINE

## 2024-04-29 RX ORDER — ATORVASTATIN CALCIUM 20 MG/1
20 TABLET, FILM COATED ORAL DAILY
Qty: 90 TABLET | Refills: 3 | Status: SHIPPED | OUTPATIENT
Start: 2024-04-29 | End: 2025-04-29

## 2024-04-29 RX ORDER — LOSARTAN POTASSIUM 50 MG/1
50 TABLET ORAL DAILY
Qty: 90 TABLET | Refills: 3 | Status: SHIPPED | OUTPATIENT
Start: 2024-04-29

## 2024-04-29 ASSESSMENT — ENCOUNTER SYMPTOMS
CHEST TIGHTNESS: 0
PALPITATIONS: 0
FEVER: 0
CONFUSION: 0
ARTHRALGIAS: 0
SHORTNESS OF BREATH: 0
ABDOMINAL PAIN: 0
CHILLS: 0

## 2024-04-29 NOTE — PROGRESS NOTES
Subjective   Patient ID: Jessie Hogan is a 64 y.o. female who presents for Follow-up (4 month).    HPI patient today for follow-up of ongoing healthcare issues and review blood work overall is been feeling good.  She has been working on lifestyle modifications in effort to improve her A1c.  She needs prescription sent away to her [place] company [facility].    Review of Systems   Constitutional:  Negative for chills and fever.   HENT:  Negative for congestion and ear pain.    Eyes:  Negative for visual disturbance.   Respiratory:  Negative for chest tightness and shortness of breath.    Cardiovascular:  Negative for chest pain and palpitations.   Gastrointestinal:  Negative for abdominal pain.   Musculoskeletal:  Negative for arthralgias.   Skin:  Negative for pallor.   Psychiatric/Behavioral:  Negative for confusion.        Objective   /84   Pulse 80   Temp 36.3 °C (97.4 °F)   Resp 14   Wt 87.5 kg (193 lb)   SpO2 94%   BMI 32.12 kg/m²     Physical Exam  Vitals and nursing note reviewed.   Constitutional:       General: She is not in acute distress.     Appearance: Normal appearance. She is not ill-appearing.   HENT:      Head: Normocephalic and atraumatic.      Right Ear: Tympanic membrane, ear canal and external ear normal.      Left Ear: Tympanic membrane, ear canal and external ear normal.      Mouth/Throat:      Pharynx: Oropharynx is clear.   Eyes:      Extraocular Movements: Extraocular movements intact.   Cardiovascular:      Rate and Rhythm: Normal rate and regular rhythm.      Pulses: Normal pulses.      Heart sounds: Normal heart sounds.   Pulmonary:      Effort: Pulmonary effort is normal.      Breath sounds: Normal breath sounds.   Abdominal:      General: Abdomen is flat. Bowel sounds are normal.      Palpations: Abdomen is soft.      Tenderness: There is no abdominal tenderness.   Musculoskeletal:         General: Normal range of motion.      Cervical back: Neck supple.   Skin:      General: Skin is warm.   Neurological:      Mental Status: She is alert and oriented to person, place, and time. Mental status is at baseline.   Psychiatric:         Mood and Affect: Mood normal.       Recent Results (from the past 1008 hour(s))   Hemoglobin A1C    Collection Time: 04/26/24  7:17 AM   Result Value Ref Range    Hemoglobin A1C 6.4 (H) see below %    Estimated Average Glucose 137 Not Established mg/dL   Lipid Panel    Collection Time: 04/26/24  7:17 AM   Result Value Ref Range    Cholesterol 213 (H) 0 - 199 mg/dL    HDL-Cholesterol 47.6 mg/dL    Cholesterol/HDL Ratio 4.5     LDL Calculated 124 (H) <=99 mg/dL    VLDL 41 (H) 0 - 40 mg/dL    Triglycerides 206 (H) 0 - 149 mg/dL    Non HDL Cholesterol 165 (H) 0 - 149 mg/dL   Comprehensive Metabolic Panel    Collection Time: 04/26/24  7:17 AM   Result Value Ref Range    Glucose 135 (H) 74 - 99 mg/dL    Sodium 142 136 - 145 mmol/L    Potassium 4.7 3.5 - 5.3 mmol/L    Chloride 106 98 - 107 mmol/L    Bicarbonate 29 21 - 32 mmol/L    Anion Gap 12 10 - 20 mmol/L    Urea Nitrogen 13 6 - 23 mg/dL    Creatinine 0.79 0.50 - 1.05 mg/dL    eGFR 85 >60 mL/min/1.73m*2    Calcium 9.7 8.6 - 10.3 mg/dL    Albumin 4.2 3.4 - 5.0 g/dL    Alkaline Phosphatase 70 33 - 136 U/L    Total Protein 6.8 6.4 - 8.2 g/dL    AST 16 9 - 39 U/L    Bilirubin, Total 0.6 0.0 - 1.2 mg/dL    ALT 16 7 - 45 U/L   Cholesterol, LDL Direct    Collection Time: 04/26/24  7:17 AM   Result Value Ref Range    LDL, Direct 143 (H) 0 - 129 mg/dL     Recent labs reviewed with patient  Start Lipitor 20 mg daily risk-benefit side effects reviewed    A1c is improving continue to work on dietary/lifestyle modifications  We also discussed introducing exercise to her weekly regimen.    Yearly diabetic eye exam    Bilateral screening mammogram ordered for end of July 2024    Return to office 4 months with repeat fasting labs    Assessment/Plan   Problem List Items Addressed This Visit             ICD-10-CM     Essential hypertension I10     Stable continue losartan 50 mg daily refill provided         Relevant Orders    CBC    Comprehensive Metabolic Panel    Follow Up In Primary Care - Established    Hyperlipidemia E78.5     LDL did improve down to 124 however given her diabetic diagnosis she is not to goal we reviewed these recommendations and suggested starting a statin and she is in agreement we will start Lipitor 20 mg daily risk-benefit side effects reviewed she verbalizes understanding.         Relevant Orders    Comprehensive Metabolic Panel    Lipid Panel    Follow Up In Primary Care - Established    Vitamin D deficiency E55.9     Continue to monitor supplement as needed         Relevant Orders    Vitamin D 25-Hydroxy,Total (for eval of Vitamin D levels)    Thyroid disorder screen Z13.29     TSH with reflex         Relevant Orders    TSH with reflex to Free T4 if abnormal    Diet-controlled type 2 diabetes mellitus ([facility]) - Primary E11.9     A1c is improved down to 6.4% continue to work on dietary/lifestyle modification         Relevant Orders    CBC    Comprehensive Metabolic Panel    Hemoglobin A1C    Follow Up In Primary Care - Established    Other screening mammogram Z12.31     Bilateral screening mammogram for end of August 2024         Relevant Orders    BI mammo bilateral screening tomosynthesis

## 2024-04-29 NOTE — ASSESSMENT & PLAN NOTE
LDL did improve down to 124 however given her diabetic diagnosis she is not to goal we reviewed these recommendations and suggested starting a statin and she is in agreement we will start Lipitor 20 mg daily risk-benefit side effects reviewed she verbalizes understanding.

## 2024-09-10 ENCOUNTER — HOSPITAL ENCOUNTER (OUTPATIENT)
Dept: RADIOLOGY | Facility: CLINIC | Age: 64
Discharge: HOME | End: 2024-09-10
Payer: COMMERCIAL

## 2024-09-10 VITALS — HEIGHT: 64 IN | BODY MASS INDEX: 32.44 KG/M2 | WEIGHT: 190 LBS

## 2024-09-10 DIAGNOSIS — Z12.31 OTHER SCREENING MAMMOGRAM: ICD-10-CM

## 2024-09-10 PROCEDURE — 77067 SCR MAMMO BI INCL CAD: CPT

## 2024-09-10 PROCEDURE — 77063 BREAST TOMOSYNTHESIS BI: CPT

## 2024-09-16 ENCOUNTER — APPOINTMENT (OUTPATIENT)
Dept: PRIMARY CARE | Facility: CLINIC | Age: 64
End: 2024-09-16
Payer: COMMERCIAL

## 2024-10-02 ENCOUNTER — APPOINTMENT (OUTPATIENT)
Dept: PRIMARY CARE | Facility: CLINIC | Age: 64
End: 2024-10-02
Payer: COMMERCIAL

## 2024-10-14 ENCOUNTER — LAB (OUTPATIENT)
Dept: LAB | Facility: LAB | Age: 64
End: 2024-10-14
Payer: COMMERCIAL

## 2024-10-14 DIAGNOSIS — E11.9 DIET-CONTROLLED TYPE 2 DIABETES MELLITUS: ICD-10-CM

## 2024-10-14 DIAGNOSIS — Z13.29 THYROID DISORDER SCREEN: ICD-10-CM

## 2024-10-14 DIAGNOSIS — E55.9 VITAMIN D DEFICIENCY: ICD-10-CM

## 2024-10-14 DIAGNOSIS — E78.2 MIXED HYPERLIPIDEMIA: ICD-10-CM

## 2024-10-14 DIAGNOSIS — I10 ESSENTIAL HYPERTENSION: ICD-10-CM

## 2024-10-14 LAB
25(OH)D3 SERPL-MCNC: 19 NG/ML (ref 30–100)
ALBUMIN SERPL BCP-MCNC: 4.3 G/DL (ref 3.4–5)
ALP SERPL-CCNC: 70 U/L (ref 33–136)
ALT SERPL W P-5'-P-CCNC: 14 U/L (ref 7–45)
ANION GAP SERPL CALC-SCNC: 10 MMOL/L (ref 10–20)
AST SERPL W P-5'-P-CCNC: 14 U/L (ref 9–39)
BILIRUB SERPL-MCNC: 0.5 MG/DL (ref 0–1.2)
BUN SERPL-MCNC: 13 MG/DL (ref 6–23)
CALCIUM SERPL-MCNC: 9.5 MG/DL (ref 8.6–10.3)
CHLORIDE SERPL-SCNC: 105 MMOL/L (ref 98–107)
CHOLEST SERPL-MCNC: 143 MG/DL (ref 0–199)
CHOLESTEROL/HDL RATIO: 2.9
CO2 SERPL-SCNC: 31 MMOL/L (ref 21–32)
CREAT SERPL-MCNC: 0.8 MG/DL (ref 0.5–1.05)
EGFRCR SERPLBLD CKD-EPI 2021: 83 ML/MIN/1.73M*2
ERYTHROCYTE [DISTWIDTH] IN BLOOD BY AUTOMATED COUNT: 12.3 % (ref 11.5–14.5)
GLUCOSE SERPL-MCNC: 115 MG/DL (ref 74–99)
HCT VFR BLD AUTO: 42.3 % (ref 36–46)
HDLC SERPL-MCNC: 49.4 MG/DL
HGB BLD-MCNC: 14 G/DL (ref 12–16)
LDLC SERPL CALC-MCNC: 75 MG/DL
MCH RBC QN AUTO: 29.2 PG (ref 26–34)
MCHC RBC AUTO-ENTMCNC: 33.1 G/DL (ref 32–36)
MCV RBC AUTO: 88 FL (ref 80–100)
NON HDL CHOLESTEROL: 94 MG/DL (ref 0–149)
NRBC BLD-RTO: 0 /100 WBCS (ref 0–0)
PLATELET # BLD AUTO: 200 X10*3/UL (ref 150–450)
POTASSIUM SERPL-SCNC: 5.4 MMOL/L (ref 3.5–5.3)
PROT SERPL-MCNC: 6.6 G/DL (ref 6.4–8.2)
RBC # BLD AUTO: 4.79 X10*6/UL (ref 4–5.2)
SODIUM SERPL-SCNC: 141 MMOL/L (ref 136–145)
TRIGL SERPL-MCNC: 91 MG/DL (ref 0–149)
TSH SERPL-ACNC: 1.83 MIU/L (ref 0.44–3.98)
VLDL: 18 MG/DL (ref 0–40)
WBC # BLD AUTO: 6.5 X10*3/UL (ref 4.4–11.3)

## 2024-10-14 PROCEDURE — 83036 HEMOGLOBIN GLYCOSYLATED A1C: CPT

## 2024-10-14 PROCEDURE — 36415 COLL VENOUS BLD VENIPUNCTURE: CPT

## 2024-10-14 PROCEDURE — 80053 COMPREHEN METABOLIC PANEL: CPT

## 2024-10-14 PROCEDURE — 85027 COMPLETE CBC AUTOMATED: CPT

## 2024-10-14 PROCEDURE — 82306 VITAMIN D 25 HYDROXY: CPT

## 2024-10-14 PROCEDURE — 84443 ASSAY THYROID STIM HORMONE: CPT

## 2024-10-14 PROCEDURE — 80061 LIPID PANEL: CPT

## 2024-10-15 ENCOUNTER — APPOINTMENT (OUTPATIENT)
Dept: PRIMARY CARE | Facility: CLINIC | Age: 64
End: 2024-10-15
Payer: COMMERCIAL

## 2024-10-15 LAB
EST. AVERAGE GLUCOSE BLD GHB EST-MCNC: 140 MG/DL
HBA1C MFR BLD: 6.5 %

## 2024-10-23 ENCOUNTER — APPOINTMENT (OUTPATIENT)
Dept: PRIMARY CARE | Facility: CLINIC | Age: 64
End: 2024-10-23
Payer: COMMERCIAL

## 2024-10-23 VITALS
BODY MASS INDEX: 31.82 KG/M2 | TEMPERATURE: 97.8 F | HEART RATE: 99 BPM | SYSTOLIC BLOOD PRESSURE: 135 MMHG | WEIGHT: 191 LBS | OXYGEN SATURATION: 95 % | DIASTOLIC BLOOD PRESSURE: 89 MMHG | RESPIRATION RATE: 15 BRPM | HEIGHT: 65 IN

## 2024-10-23 DIAGNOSIS — E87.5 HYPERKALEMIA: ICD-10-CM

## 2024-10-23 DIAGNOSIS — I10 ESSENTIAL HYPERTENSION: ICD-10-CM

## 2024-10-23 DIAGNOSIS — E55.9 VITAMIN D DEFICIENCY: ICD-10-CM

## 2024-10-23 DIAGNOSIS — E11.9 DIET-CONTROLLED TYPE 2 DIABETES MELLITUS: ICD-10-CM

## 2024-10-23 DIAGNOSIS — Z23 ENCOUNTER FOR IMMUNIZATION: ICD-10-CM

## 2024-10-23 DIAGNOSIS — E78.2 MIXED HYPERLIPIDEMIA: ICD-10-CM

## 2024-10-23 DIAGNOSIS — Z00.00 ANNUAL PHYSICAL EXAM: Primary | ICD-10-CM

## 2024-10-23 PROCEDURE — 3075F SYST BP GE 130 - 139MM HG: CPT | Performed by: FAMILY MEDICINE

## 2024-10-23 PROCEDURE — 3044F HG A1C LEVEL LT 7.0%: CPT | Performed by: FAMILY MEDICINE

## 2024-10-23 PROCEDURE — 90656 IIV3 VACC NO PRSV 0.5 ML IM: CPT | Performed by: FAMILY MEDICINE

## 2024-10-23 PROCEDURE — 3079F DIAST BP 80-89 MM HG: CPT | Performed by: FAMILY MEDICINE

## 2024-10-23 PROCEDURE — 99396 PREV VISIT EST AGE 40-64: CPT | Performed by: FAMILY MEDICINE

## 2024-10-23 PROCEDURE — 3008F BODY MASS INDEX DOCD: CPT | Performed by: FAMILY MEDICINE

## 2024-10-23 PROCEDURE — 90471 IMMUNIZATION ADMIN: CPT | Performed by: FAMILY MEDICINE

## 2024-10-23 PROCEDURE — 4010F ACE/ARB THERAPY RXD/TAKEN: CPT | Performed by: FAMILY MEDICINE

## 2024-10-23 PROCEDURE — 99214 OFFICE O/P EST MOD 30 MIN: CPT | Performed by: FAMILY MEDICINE

## 2024-10-23 PROCEDURE — 3048F LDL-C <100 MG/DL: CPT | Performed by: FAMILY MEDICINE

## 2024-10-23 PROCEDURE — 1036F TOBACCO NON-USER: CPT | Performed by: FAMILY MEDICINE

## 2024-10-23 ASSESSMENT — ENCOUNTER SYMPTOMS
FEVER: 0
ABDOMINAL PAIN: 0
PALPITATIONS: 0
CONFUSION: 0
SHORTNESS OF BREATH: 0
CHILLS: 0
CHEST TIGHTNESS: 0
ARTHRALGIAS: 0

## 2024-10-23 ASSESSMENT — PATIENT HEALTH QUESTIONNAIRE - PHQ9
SUM OF ALL RESPONSES TO PHQ9 QUESTIONS 1 AND 2: 0
2. FEELING DOWN, DEPRESSED OR HOPELESS: NOT AT ALL
1. LITTLE INTEREST OR PLEASURE IN DOING THINGS: NOT AT ALL

## 2024-10-23 ASSESSMENT — COLUMBIA-SUICIDE SEVERITY RATING SCALE - C-SSRS
1. IN THE PAST MONTH, HAVE YOU WISHED YOU WERE DEAD OR WISHED YOU COULD GO TO SLEEP AND NOT WAKE UP?: NO
6. HAVE YOU EVER DONE ANYTHING, STARTED TO DO ANYTHING, OR PREPARED TO DO ANYTHING TO END YOUR LIFE?: NO
2. HAVE YOU ACTUALLY HAD ANY THOUGHTS OF KILLING YOURSELF?: NO

## 2024-10-23 NOTE — ASSESSMENT & PLAN NOTE
Recent labs reviewed    Colon cancer screening up-to-date  Mammogram up-to-date    Flu shot x 1

## 2024-10-23 NOTE — PROGRESS NOTES
"Subjective   Patient ID: Jessie Hogan is a 64 y.o. female who presents for Annual Exam (4 month).    HPI patient today for follow-up of ongoing healthcare issues and for annual checkup overalls been feeling good.  Denies any major new acute complaints.  She would like to get a flu shot.    Review of Systems   Constitutional:  Negative for chills and fever.   HENT:  Negative for congestion and ear pain.    Eyes:  Negative for visual disturbance.   Respiratory:  Negative for chest tightness and shortness of breath.    Cardiovascular:  Negative for chest pain and palpitations.   Gastrointestinal:  Negative for abdominal pain.   Musculoskeletal:  Negative for arthralgias.   Skin:  Negative for pallor.   Psychiatric/Behavioral:  Negative for confusion.        Objective   /89   Pulse 99   Temp 36.6 °C (97.8 °F)   Resp 15   Ht 1.651 m (5' 5\")   Wt 86.6 kg (191 lb)   SpO2 95%   BMI 31.78 kg/m²     Physical Exam  Vitals and nursing note reviewed.   Constitutional:       General: She is not in acute distress.     Appearance: Normal appearance. She is not ill-appearing.   HENT:      Head: Normocephalic and atraumatic.      Right Ear: Tympanic membrane, ear canal and external ear normal.      Left Ear: Tympanic membrane, ear canal and external ear normal.      Mouth/Throat:      Pharynx: Oropharynx is clear.   Eyes:      Extraocular Movements: Extraocular movements intact.   Cardiovascular:      Rate and Rhythm: Normal rate and regular rhythm.      Pulses: Normal pulses.      Heart sounds: Normal heart sounds.   Pulmonary:      Effort: Pulmonary effort is normal.      Breath sounds: Normal breath sounds.   Abdominal:      General: Abdomen is flat. Bowel sounds are normal.      Palpations: Abdomen is soft.      Tenderness: There is no abdominal tenderness.   Musculoskeletal:         General: Normal range of motion.      Cervical back: Neck supple.   Skin:     General: Skin is warm.   Neurological:      Mental " Status: She is alert and oriented to person, place, and time. Mental status is at baseline.   Psychiatric:         Mood and Affect: Mood normal.       Recent Results (from the past 6 weeks)   CBC    Collection Time: 10/14/24  9:53 AM   Result Value Ref Range    WBC 6.5 4.4 - 11.3 x10*3/uL    nRBC 0.0 0.0 - 0.0 /100 WBCs    RBC 4.79 4.00 - 5.20 x10*6/uL    Hemoglobin 14.0 12.0 - 16.0 g/dL    Hematocrit 42.3 36.0 - 46.0 %    MCV 88 80 - 100 fL    MCH 29.2 26.0 - 34.0 pg    MCHC 33.1 32.0 - 36.0 g/dL    RDW 12.3 11.5 - 14.5 %    Platelets 200 150 - 450 x10*3/uL   Comprehensive Metabolic Panel    Collection Time: 10/14/24  9:53 AM   Result Value Ref Range    Glucose 115 (H) 74 - 99 mg/dL    Sodium 141 136 - 145 mmol/L    Potassium 5.4 (H) 3.5 - 5.3 mmol/L    Chloride 105 98 - 107 mmol/L    Bicarbonate 31 21 - 32 mmol/L    Anion Gap 10 10 - 20 mmol/L    Urea Nitrogen 13 6 - 23 mg/dL    Creatinine 0.80 0.50 - 1.05 mg/dL    eGFR 83 >60 mL/min/1.73m*2    Calcium 9.5 8.6 - 10.3 mg/dL    Albumin 4.3 3.4 - 5.0 g/dL    Alkaline Phosphatase 70 33 - 136 U/L    Total Protein 6.6 6.4 - 8.2 g/dL    AST 14 9 - 39 U/L    Bilirubin, Total 0.5 0.0 - 1.2 mg/dL    ALT 14 7 - 45 U/L   Hemoglobin A1C    Collection Time: 10/14/24  9:53 AM   Result Value Ref Range    Hemoglobin A1C 6.5 (H) See comment %    Estimated Average Glucose 140 Not Established mg/dL   Lipid Panel    Collection Time: 10/14/24  9:53 AM   Result Value Ref Range    Cholesterol 143 0 - 199 mg/dL    HDL-Cholesterol 49.4 mg/dL    Cholesterol/HDL Ratio 2.9     LDL Calculated 75 <=99 mg/dL    VLDL 18 0 - 40 mg/dL    Triglycerides 91 0 - 149 mg/dL    Non HDL Cholesterol 94 0 - 149 mg/dL   Vitamin D 25-Hydroxy,Total (for eval of Vitamin D levels)    Collection Time: 10/14/24  9:53 AM   Result Value Ref Range    Vitamin D, 25-Hydroxy, Total 19 (L) 30 - 100 ng/mL   TSH with reflex to Free T4 if abnormal    Collection Time: 10/14/24  9:53 AM   Result Value Ref Range    Thyroid  Stimulating Hormone 1.83 0.44 - 3.98 mIU/L     Recent labs reviewed restart vitamin D3 5000 units daily follow low-fat low-cholesterol diabetic diet and continue current medication   flu shot x 1    Bilateral screening mammogram last month reviewed no mammographic evidence of malignancy repeat mammogram 1 year    Return to office 4 months with repeat fasting labs        Assessment/Plan   Problem List Items Addressed This Visit             ICD-10-CM    Essential hypertension I10     Stable continue losartan 50 mg daily         Relevant Orders    Follow Up In Primary Care - Established    Comprehensive Metabolic Panel    Hyperlipidemia E78.5     Continue atorvastatin 20 mg daily         Relevant Orders    Follow Up In Primary Care - Established    Comprehensive Metabolic Panel    Lipid Panel    Vitamin D deficiency E55.9     Restart vitamin D3 5000 units daily         Relevant Orders    Vitamin D 25-Hydroxy,Total (for eval of Vitamin D levels)    Diet-controlled type 2 diabetes mellitus E11.9     A1c 6.5% continue work on dietary modification diet exercise and weight loss.    Yearly diabetic eye exam         Relevant Orders    Follow Up In Primary Care - Established    Comprehensive Metabolic Panel    Hemoglobin A1C    Albumin-Creatinine Ratio, Urine Random    Encounter for immunization Z23     Flu Shot x 1         Relevant Orders    Flu vaccine, trivalent, preservative free, age 6 months and greater (Fluarix/Fluzone/Flulaval) (Completed)    Hyperkalemia E87.5     Mildly elevated potassium.  Recheck a [facility] next week call for results.         Relevant Orders    Basic Metabolic Panel    Annual physical exam - Primary Z00.00     Recent labs reviewed    Colon cancer screening up-to-date  Mammogram up-to-date    Flu shot x 1

## 2024-10-29 ENCOUNTER — LAB (OUTPATIENT)
Dept: LAB | Facility: LAB | Age: 64
End: 2024-10-29
Payer: COMMERCIAL

## 2024-10-29 DIAGNOSIS — E87.5 HYPERKALEMIA: ICD-10-CM

## 2024-10-29 LAB
ANION GAP SERPL CALC-SCNC: 11 MMOL/L (ref 10–20)
BUN SERPL-MCNC: 9 MG/DL (ref 6–23)
CALCIUM SERPL-MCNC: 9.3 MG/DL (ref 8.6–10.3)
CHLORIDE SERPL-SCNC: 103 MMOL/L (ref 98–107)
CO2 SERPL-SCNC: 30 MMOL/L (ref 21–32)
CREAT SERPL-MCNC: 0.64 MG/DL (ref 0.5–1.05)
EGFRCR SERPLBLD CKD-EPI 2021: >90 ML/MIN/1.73M*2
GLUCOSE SERPL-MCNC: 130 MG/DL (ref 74–99)
POTASSIUM SERPL-SCNC: 4.5 MMOL/L (ref 3.5–5.3)
SODIUM SERPL-SCNC: 139 MMOL/L (ref 136–145)

## 2024-10-29 PROCEDURE — 36415 COLL VENOUS BLD VENIPUNCTURE: CPT

## 2024-10-29 PROCEDURE — 80048 BASIC METABOLIC PNL TOTAL CA: CPT

## 2025-03-12 ENCOUNTER — APPOINTMENT (OUTPATIENT)
Dept: PRIMARY CARE | Facility: CLINIC | Age: 65
End: 2025-03-12
Payer: COMMERCIAL

## 2025-04-15 ENCOUNTER — APPOINTMENT (OUTPATIENT)
Dept: PRIMARY CARE | Facility: CLINIC | Age: 65
End: 2025-04-15
Payer: COMMERCIAL

## 2025-04-30 ENCOUNTER — APPOINTMENT (OUTPATIENT)
Dept: PRIMARY CARE | Facility: CLINIC | Age: 65
End: 2025-04-30
Payer: COMMERCIAL

## 2025-05-20 ENCOUNTER — APPOINTMENT (OUTPATIENT)
Dept: PRIMARY CARE | Facility: CLINIC | Age: 65
End: 2025-05-20
Payer: COMMERCIAL

## 2025-06-03 ENCOUNTER — APPOINTMENT (OUTPATIENT)
Dept: PRIMARY CARE | Facility: CLINIC | Age: 65
End: 2025-06-03
Payer: COMMERCIAL

## 2025-06-03 VITALS
WEIGHT: 200.6 LBS | DIASTOLIC BLOOD PRESSURE: 86 MMHG | OXYGEN SATURATION: 94 % | TEMPERATURE: 97.4 F | BODY MASS INDEX: 33.38 KG/M2 | RESPIRATION RATE: 18 BRPM | SYSTOLIC BLOOD PRESSURE: 132 MMHG | HEART RATE: 82 BPM

## 2025-06-03 DIAGNOSIS — R53.83 OTHER FATIGUE: ICD-10-CM

## 2025-06-03 DIAGNOSIS — I10 ESSENTIAL HYPERTENSION: ICD-10-CM

## 2025-06-03 DIAGNOSIS — E11.9 DIET-CONTROLLED TYPE 2 DIABETES MELLITUS: ICD-10-CM

## 2025-06-03 DIAGNOSIS — Z12.31 OTHER SCREENING MAMMOGRAM: Primary | ICD-10-CM

## 2025-06-03 DIAGNOSIS — E55.9 VITAMIN D DEFICIENCY: ICD-10-CM

## 2025-06-03 DIAGNOSIS — E78.2 MIXED HYPERLIPIDEMIA: ICD-10-CM

## 2025-06-03 PROBLEM — Z13.29 THYROID DISORDER SCREEN: Status: RESOLVED | Noted: 2023-08-17 | Resolved: 2025-06-03

## 2025-06-03 LAB
25(OH)D3+25(OH)D2 SERPL-MCNC: 39 NG/ML (ref 30–100)
ALBUMIN SERPL-MCNC: 4.5 G/DL (ref 3.6–5.1)
ALP SERPL-CCNC: 68 U/L (ref 37–153)
ALT SERPL-CCNC: 16 U/L (ref 6–29)
ANION GAP SERPL CALCULATED.4IONS-SCNC: 7 MMOL/L (CALC) (ref 7–17)
AST SERPL-CCNC: 17 U/L (ref 10–35)
BILIRUB SERPL-MCNC: 0.5 MG/DL (ref 0.2–1.2)
BUN SERPL-MCNC: 15 MG/DL (ref 7–25)
CALCIUM SERPL-MCNC: 9.3 MG/DL (ref 8.6–10.4)
CHLORIDE SERPL-SCNC: 107 MMOL/L (ref 98–110)
CHOLEST SERPL-MCNC: 159 MG/DL
CHOLEST/HDLC SERPL: 2.9 (CALC)
CO2 SERPL-SCNC: 28 MMOL/L (ref 20–32)
CREAT SERPL-MCNC: 0.73 MG/DL (ref 0.5–1.05)
EGFRCR SERPLBLD CKD-EPI 2021: 92 ML/MIN/1.73M2
EST. AVERAGE GLUCOSE BLD GHB EST-MCNC: 140 MG/DL
EST. AVERAGE GLUCOSE BLD GHB EST-SCNC: 7.7 MMOL/L
GLUCOSE SERPL-MCNC: 114 MG/DL (ref 65–99)
HBA1C MFR BLD: 6.5 %
HDLC SERPL-MCNC: 55 MG/DL
LDLC SERPL CALC-MCNC: 85 MG/DL (CALC)
NONHDLC SERPL-MCNC: 104 MG/DL (CALC)
POTASSIUM SERPL-SCNC: 4.4 MMOL/L (ref 3.5–5.3)
PROT SERPL-MCNC: 7 G/DL (ref 6.1–8.1)
SODIUM SERPL-SCNC: 142 MMOL/L (ref 135–146)
TRIGL SERPL-MCNC: 97 MG/DL

## 2025-06-03 PROCEDURE — 4010F ACE/ARB THERAPY RXD/TAKEN: CPT | Performed by: FAMILY MEDICINE

## 2025-06-03 PROCEDURE — 1036F TOBACCO NON-USER: CPT | Performed by: FAMILY MEDICINE

## 2025-06-03 PROCEDURE — 99214 OFFICE O/P EST MOD 30 MIN: CPT | Performed by: FAMILY MEDICINE

## 2025-06-03 PROCEDURE — 3075F SYST BP GE 130 - 139MM HG: CPT | Performed by: FAMILY MEDICINE

## 2025-06-03 PROCEDURE — 3079F DIAST BP 80-89 MM HG: CPT | Performed by: FAMILY MEDICINE

## 2025-06-03 RX ORDER — LOSARTAN POTASSIUM 50 MG/1
50 TABLET ORAL DAILY
Qty: 90 TABLET | Refills: 3 | Status: SHIPPED | OUTPATIENT
Start: 2025-06-03

## 2025-06-03 RX ORDER — ATORVASTATIN CALCIUM 20 MG/1
20 TABLET, FILM COATED ORAL DAILY
Qty: 90 TABLET | Refills: 3 | Status: SHIPPED | OUTPATIENT
Start: 2025-06-03 | End: 2026-06-03

## 2025-06-03 ASSESSMENT — ENCOUNTER SYMPTOMS
ABDOMINAL PAIN: 0
CHILLS: 0
CHEST TIGHTNESS: 0
CONFUSION: 0
PALPITATIONS: 0
SHORTNESS OF BREATH: 0
ARTHRALGIAS: 0
FEVER: 0

## 2025-06-03 NOTE — PROGRESS NOTES
Subjective   Patient ID: Jessie Hogan is a 65 y.o. female who presents for Follow-up (4 month follow up ).    HPI   Patient today for follow-up of ongoing healthcare issues and review of lab work overalls been doing well denies any major new acute complaints needs refills on her medications.  Review of Systems   Constitutional:  Negative for chills and fever.   HENT:  Negative for congestion and ear pain.    Eyes:  Negative for visual disturbance.   Respiratory:  Negative for chest tightness and shortness of breath.    Cardiovascular:  Negative for chest pain and palpitations.   Gastrointestinal:  Negative for abdominal pain.   Musculoskeletal:  Negative for arthralgias.   Skin:  Negative for pallor.   Psychiatric/Behavioral:  Negative for confusion.        Objective   /86   Pulse 82   Temp 36.3 °C (97.4 °F) (Temporal)   Resp 18   Wt 91 kg (200 lb 9.6 oz)   SpO2 94%   BMI 33.38 kg/m²     Physical Exam  Vitals and nursing note reviewed.   Constitutional:       General: She is not in acute distress.     Appearance: Normal appearance. She is not ill-appearing.   HENT:      Head: Normocephalic and atraumatic.      Right Ear: Tympanic membrane, ear canal and external ear normal.      Left Ear: Tympanic membrane, ear canal and external ear normal.      Mouth/Throat:      Pharynx: Oropharynx is clear.   Eyes:      Extraocular Movements: Extraocular movements intact.   Cardiovascular:      Rate and Rhythm: Normal rate and regular rhythm.      Pulses: Normal pulses.      Heart sounds: Normal heart sounds.   Pulmonary:      Effort: Pulmonary effort is normal.      Breath sounds: Normal breath sounds.   Abdominal:      General: Abdomen is flat. Bowel sounds are normal.      Palpations: Abdomen is soft.      Tenderness: There is no abdominal tenderness.   Musculoskeletal:         General: Normal range of motion.      Cervical back: Neck supple.   Skin:     General: Skin is warm.   Neurological:      Mental Status:  She is alert and oriented to person, place, and time. Mental status is at baseline.   Psychiatric:         Mood and Affect: Mood normal.       Recent Results (from the past 6 weeks)   Lipid Panel    Collection Time: 06/02/25  8:46 AM   Result Value Ref Range    CHOLESTEROL, TOTAL 159 <200 mg/dL    HDL CHOLESTEROL 55 > OR = 50 mg/dL    TRIGLYCERIDES 97 <150 mg/dL    LDL-CHOLESTEROL 85 mg/dL (calc)    CHOL/HDLC RATIO 2.9 <5.0 (calc)    NON HDL CHOLESTEROL 104 <130 mg/dL (calc)   Comprehensive Metabolic Panel    Collection Time: 06/02/25  8:46 AM   Result Value Ref Range    GLUCOSE 114 (H) 65 - 99 mg/dL    UREA NITROGEN (BUN) 15 7 - 25 mg/dL    CREATININE 0.73 0.50 - 1.05 mg/dL    EGFR 92 > OR = 60 mL/min/1.73m2    SODIUM 142 135 - 146 mmol/L    POTASSIUM 4.4 3.5 - 5.3 mmol/L    CHLORIDE 107 98 - 110 mmol/L    CARBON DIOXIDE 28 20 - 32 mmol/L    ELECTROLYTE BALANCE 7 7 - 17 mmol/L (calc)    CALCIUM 9.3 8.6 - 10.4 mg/dL    PROTEIN, TOTAL 7.0 6.1 - 8.1 g/dL    ALBUMIN 4.5 3.6 - 5.1 g/dL    BILIRUBIN, TOTAL 0.5 0.2 - 1.2 mg/dL    ALKALINE PHOSPHATASE 68 37 - 153 U/L    AST 17 10 - 35 U/L    ALT 16 6 - 29 U/L   Vitamin D 25-Hydroxy,Total (for eval of Vitamin D levels)    Collection Time: 06/02/25  8:46 AM   Result Value Ref Range    VITAMIN D,25-OH,TOTAL,IA 39 30 - 100 ng/mL   Hemoglobin A1C    Collection Time: 06/02/25  8:46 AM   Result Value Ref Range    HEMOGLOBIN A1c 6.5 (H) <5.7 %    eAG (mg/dL) 140 mg/dL    eAG (mmol/L) 7.7 mmol/L     Recent labs reviewed with patient overall numbers are stable continue current medications refills provided    Low-fat low-cholesterol diabetic diet and lifestyle modification    She believes she received a booster [place] at the [facility] locally she will try to get a copy of that report.  Bilateral screening mammogram ordered for September 2025    Return to our office 6 months with repeat fasting labs      Assessment/Plan   Problem List Items Addressed This Visit           ICD-10-CM    Essential  hypertension I10    BP stable refill losartan 50 mg daily         Relevant Medications    losartan (Cozaar) 50 mg tablet    Other Relevant Orders    CBC    Comprehensive Metabolic Panel    TSH with reflex to Free T4 if abnormal    Follow Up In Primary Care - Established    Hyperlipidemia E78.5    Lipids stable continue atorvastatin 20 mg daily along with lifestyle modifications         Relevant Medications    atorvastatin (Lipitor) 20 mg tablet    Other Relevant Orders    CBC    Comprehensive Metabolic Panel    Lipid Panel    TSH with reflex to Free T4 if abnormal    Follow Up In Primary Care - Established    Vitamin D deficiency E55.9    Monitor supplement as needed         Relevant Orders    Vitamin D 25-Hydroxy,Total (for eval of Vitamin D levels)    Diet-controlled type 2 diabetes mellitus E11.9    A1c 6.5% continue dietary/lifestyle modifications         Relevant Orders    CBC    Albumin-Creatinine Ratio, Urine Random    Comprehensive Metabolic Panel    Hemoglobin A1C    TSH with reflex to Free T4 if abnormal    Follow Up In Primary Care - Established    Other screening mammogram - Primary Z12.31    Bilateral screening mammogram September 2025         Relevant Orders    BI mammo bilateral screening tomosynthesis    Other fatigue R53.83    TSH with reflex         Relevant Orders    TSH with reflex to Free T4 if abnormal

## 2025-06-04 ENCOUNTER — APPOINTMENT (OUTPATIENT)
Dept: PRIMARY CARE | Facility: CLINIC | Age: 65
End: 2025-06-04
Payer: COMMERCIAL

## 2025-12-03 ENCOUNTER — APPOINTMENT (OUTPATIENT)
Dept: PRIMARY CARE | Facility: CLINIC | Age: 65
End: 2025-12-03
Payer: COMMERCIAL